# Patient Record
(demographics unavailable — no encounter records)

---

## 2025-02-02 NOTE — PHYSICAL EXAM
[Alert] : alert [Normocephalic] : normocephalic [Flat Open Anterior [place]] : flat open anterior fontanelle [PERRL] : PERRL [Red Reflex Bilateral] : red reflex bilateral [Normally Placed Ears] : normally placed ears [Auricles Well Formed] : auricles well formed [Clear Tympanic membranes] : clear tympanic membranes [Light reflex present] : light reflex present [Bony structures visible] : bony structures visible [Patent Auditory Canal] : patent auditory canal [Nares Patent] : nares patent [Palate Intact] : palate intact [Uvula Midline] : uvula midline [Supple, full passive range of motion] : supple, full passive range of motion [Symmetric Chest Rise] : symmetric chest rise [Clear to Auscultation Bilaterally] : clear to auscultation bilaterally [Regular Rate and Rhythm] : regular rate and rhythm [S1, S2 present] : S1, S2 present [+2 Femoral Pulses] : +2 femoral pulses [Soft] : soft [Bowel Sounds] : bowel sounds present [Umbilical Stump Dry, Clean, Intact] : umbilical stump dry, clean, intact [Normal external genitailia] : normal external genitalia [Central Urethral Opening] : central urethral opening [Testicles Descended Bilaterally] : testicles descended bilaterally [Patent] : patent [Normally Placed] : normally placed [No Abnormal Lymph Nodes Palpated] : no abnormal lymph nodes palpated [Symmetric Flexed Extremities] : symmetric flexed extremities [Startle Reflex] : startle reflex present [Suck Reflex] : suck reflex present [Rooting] : rooting reflex present [Palmar Grasp] : palmar grasp present [Plantar Grasp] : plantar reflex present [Symmetric [place]] : symmetric [place] [Circumcised] : circumcised [Acute Distress] : no acute distress [Icteric sclera] : nonicteric sclera [Discharge] : no discharge [Palpable Masses] : no palpable masses [Murmurs] : no murmurs [Tender] : nontender [Distended] : not distended [Hepatomegaly] : no hepatomegaly [Splenomegaly] : no splenomegaly [Mills-Ortolani] : negative Mills-Ortolani [Spinal Dimple] : no spinal dimple [Tuft of Hair] : no tuft of hair [Jaundice] : not jaundice

## 2025-02-02 NOTE — PHYSICAL EXAM
[Alert] : alert [Normocephalic] : normocephalic [Flat Open Anterior [place]] : flat open anterior fontanelle [PERRL] : PERRL [Red Reflex Bilateral] : red reflex bilateral [Normally Placed Ears] : normally placed ears [Auricles Well Formed] : auricles well formed [Clear Tympanic membranes] : clear tympanic membranes [Light reflex present] : light reflex present [Bony structures visible] : bony structures visible [Patent Auditory Canal] : patent auditory canal [Nares Patent] : nares patent [Palate Intact] : palate intact [Uvula Midline] : uvula midline [Supple, full passive range of motion] : supple, full passive range of motion [Symmetric Chest Rise] : symmetric chest rise [Clear to Auscultation Bilaterally] : clear to auscultation bilaterally [Regular Rate and Rhythm] : regular rate and rhythm [S1, S2 present] : S1, S2 present [+2 Femoral Pulses] : +2 femoral pulses [Soft] : soft [Bowel Sounds] : bowel sounds present [Umbilical Stump Dry, Clean, Intact] : umbilical stump dry, clean, intact [Normal external genitailia] : normal external genitalia [Central Urethral Opening] : central urethral opening [Testicles Descended Bilaterally] : testicles descended bilaterally [Patent] : patent [Normally Placed] : normally placed [No Abnormal Lymph Nodes Palpated] : no abnormal lymph nodes palpated [Symmetric Flexed Extremities] : symmetric flexed extremities [Startle Reflex] : startle reflex present [Suck Reflex] : suck reflex present [Rooting] : rooting reflex present [Palmar Grasp] : palmar grasp present [Plantar Grasp] : plantar reflex present [Symmetric [place]] : symmetric [place] [Circumcised] : circumcised [Acute Distress] : no acute distress [Icteric sclera] : nonicteric sclera [Discharge] : no discharge [Palpable Masses] : no palpable masses [Murmurs] : no murmurs [Tender] : nontender [Distended] : not distended [Hepatomegaly] : no hepatomegaly [Splenomegaly] : no splenomegaly [Mills-Ortolani] : negative Mills-Ortolani [Spinal Dimple] : no spinal dimple [Tuft of Hair] : no tuft of hair [Jaundice] : not jaundice

## 2025-02-02 NOTE — HISTORY OF PRESENT ILLNESS
[[facility]] : at [facility] [(1) _____] : [unfilled] [(5) _____] : [unfilled] [BW: _____] : weight of [unfilled] [DW: _____] : Discharge weight was [unfilled] [RSV vaccine] : RSV vaccine received by mother at least 14 days prior to delivery [TcB: _____] : Transcutaneous Bilirubin [unfilled] mg/dL [Breast milk] : breast milk [Normal] : Normal [FreeTextEntry8] : CIRC PREFORMED BABY REC HEP B VACCINE   VAGINAL BIRTH  [de-identified] : Anticipatory Guidance Provided [de-identified] : REC HEP B VACCINE IN HOSPITAL  [FreeTextEntry1] : PT IS HERE WITH PARENTS  PT IS HERE FOR NBWV  BIRTH WEIGHT --> 7lbs 14oz TODAYS WEIGHT-->  WEIGHT AT DISCHARGE--> 7lbs 12oz LENGTH @ BIRTH--> 20in BREAST MILK AND ENFAMIL GENTLEASE  NATURAL  HEP B GIVEN ON 1/29/2025   MOM STATES SHE FORGOT THE NB SCREEN AT HOME WILL BRING ON NEXT VISIT  PARENTS HAVE BREATHING QUESTIONS

## 2025-02-02 NOTE — DISCUSSION/SUMMARY
[Normal Growth] : growth [Normal Development] : developmental [No Elimination Concerns] : elimination [Continue Regimen] : feeding [No Skin Concerns] : skin [Normal Sleep Pattern] : sleep [None] : no known medical problems [Anticipatory Guidance Given] : Anticipatory guidance addressed as per the history of present illness section [Hepatitis B In Hospital] : Hepatitis B administered while in the hospital [No Vaccines] : no vaccines needed [No Medications] : ~He/She~ is not on any medications [Parent/Guardian] : Parent/Guardian [FreeTextEntry1] : Counseled fully. PREWORK: Reviewed prior notes, reports, and results. Independent historian; parent.  TCB: 10.5  RTO FOR WEIGHT CHECK & LACTATION CONSULT   Recommend exclusive breastfeeding, 8-12 feedings per day. Mother should continue prenatal vitamins and avoid alcohol. If formula is needed, recommend iron-fortified formulations every 2-3 hrs. When in car, patient should be in rear-facing car seat in back seat. Air dry umbillical stump. Put baby to sleep on back, in own crib with no loose or soft bedding. Limit baby's exposure to others, especially those with fever or unknown vaccine status.

## 2025-02-02 NOTE — HISTORY OF PRESENT ILLNESS
[[facility]] : at [facility] [(1) _____] : [unfilled] [(5) _____] : [unfilled] [BW: _____] : weight of [unfilled] [DW: _____] : Discharge weight was [unfilled] [RSV vaccine] : RSV vaccine received by mother at least 14 days prior to delivery [TcB: _____] : Transcutaneous Bilirubin [unfilled] mg/dL [Breast milk] : breast milk [Normal] : Normal [FreeTextEntry8] : CIRC PREFORMED BABY REC HEP B VACCINE   VAGINAL BIRTH  [de-identified] : Anticipatory Guidance Provided [de-identified] : REC HEP B VACCINE IN HOSPITAL  [FreeTextEntry1] : PT IS HERE WITH PARENTS  PT IS HERE FOR NBWV  BIRTH WEIGHT --> 7lbs 14oz TODAYS WEIGHT-->  WEIGHT AT DISCHARGE--> 7lbs 12oz LENGTH @ BIRTH--> 20in BREAST MILK AND ENFAMIL GENTLEASE  NATURAL  HEP B GIVEN ON 1/29/2025   MOM STATES SHE FORGOT THE NB SCREEN AT HOME WILL BRING ON NEXT VISIT  PARENTS HAVE BREATHING QUESTIONS

## 2025-02-06 NOTE — HISTORY OF PRESENT ILLNESS
[FreeTextEntry6] : PT IS 8DAYS OLD, HERE WITH PARENTS HERE FOR LACTATION AND WEIGHT CHECK  FEEDING BREASTMILK AND FORMULA (35ML EVERY 2 HOURS )  PREVIOUS WEIGHT: 7LB 14OZ CURRENT WEIGHT: 8LB 5OZ  plenty or urine and stool. stool is yellow  mustard seedy

## 2025-02-06 NOTE — DISCUSSION/SUMMARY
[FreeTextEntry1] : Recommend exclusive breastfeeding, 8-12 feedings per day. Mother should continue prenatal vitamins and avoid alcohol. If formula is needed, recommend iron-fortified formulations every 2-3 hrs. When in car, patient should be in rear-facing car seat in back seat. Air dry umbillical stump. Put baby to sleep on back, in own crib with no loose or soft bedding. Limit baby's exposure to others, especially those with fever or unknown vaccine status. successfully latched on left breast in cross cradle position, deep and comfortable latch obtained   independent historian parents   Records reviewed. Fully counseled. All questions and concerns addressed.

## 2025-02-28 NOTE — PHYSICAL EXAM
[NL] : soft, nontender, nondistended, normal bowel sounds, no hepatosplenomegaly [de-identified] : RED RASH UNDER NECK

## 2025-02-28 NOTE — HISTORY OF PRESENT ILLNESS
[FreeTextEntry6] : PT IS HERE WITH PARENTS   PT IS HERE FOR RASH ALL OVER BODY  MOM STATES PT IS COUGHING ONLY AT NIGHT  AVEENO BODY WASH AND LOTION  DREFT BABY DETERGENT

## 2025-02-28 NOTE — DISCUSSION/SUMMARY
[FreeTextEntry1] :  Discussed findings. F/U if symptoms persist or worse  Counseled fully.  Prework: Reviewed prior notes, reports, and results.   Independent historian; parent.